# Patient Record
Sex: MALE | Race: WHITE | NOT HISPANIC OR LATINO
[De-identification: names, ages, dates, MRNs, and addresses within clinical notes are randomized per-mention and may not be internally consistent; named-entity substitution may affect disease eponyms.]

---

## 2018-02-23 ENCOUNTER — TRANSCRIPTION ENCOUNTER (OUTPATIENT)
Age: 58
End: 2018-02-23

## 2018-02-25 ENCOUNTER — FORM ENCOUNTER (OUTPATIENT)
Age: 58
End: 2018-02-25

## 2018-02-26 ENCOUNTER — OUTPATIENT (OUTPATIENT)
Dept: OUTPATIENT SERVICES | Facility: HOSPITAL | Age: 58
LOS: 1 days | End: 2018-02-26

## 2018-02-26 ENCOUNTER — APPOINTMENT (OUTPATIENT)
Dept: RADIOLOGY | Facility: CLINIC | Age: 58
End: 2018-02-26
Payer: MEDICAID

## 2018-02-26 ENCOUNTER — APPOINTMENT (OUTPATIENT)
Dept: ORTHOPEDIC SURGERY | Facility: CLINIC | Age: 58
End: 2018-02-26
Payer: MEDICAID

## 2018-02-26 VITALS — WEIGHT: 123 LBS | BODY MASS INDEX: 18.64 KG/M2 | HEIGHT: 68 IN

## 2018-02-26 DIAGNOSIS — Z80.9 FAMILY HISTORY OF MALIGNANT NEOPLASM, UNSPECIFIED: ICD-10-CM

## 2018-02-26 PROBLEM — Z00.00 ENCOUNTER FOR PREVENTIVE HEALTH EXAMINATION: Status: ACTIVE | Noted: 2018-02-26

## 2018-02-26 PROCEDURE — 99204 OFFICE O/P NEW MOD 45 MIN: CPT | Mod: 25

## 2018-02-26 PROCEDURE — 20605 DRAIN/INJ JOINT/BURSA W/O US: CPT | Mod: LT

## 2018-02-26 PROCEDURE — 71046 X-RAY EXAM CHEST 2 VIEWS: CPT | Mod: 26

## 2018-02-26 RX ORDER — RITONAVIR 100 MG/1
100 CAPSULE ORAL
Refills: 0 | Status: ACTIVE | COMMUNITY

## 2018-02-26 RX ORDER — IBUPROFEN 600 MG/1
600 TABLET, FILM COATED ORAL
Refills: 0 | Status: ACTIVE | COMMUNITY

## 2018-02-27 ENCOUNTER — NON-APPOINTMENT (OUTPATIENT)
Age: 58
End: 2018-02-27

## 2018-02-27 ENCOUNTER — LABORATORY RESULT (OUTPATIENT)
Age: 58
End: 2018-02-27

## 2018-02-27 ENCOUNTER — APPOINTMENT (OUTPATIENT)
Dept: FAMILY MEDICINE | Facility: CLINIC | Age: 58
End: 2018-02-27
Payer: MEDICAID

## 2018-02-27 VITALS
HEART RATE: 66 BPM | DIASTOLIC BLOOD PRESSURE: 72 MMHG | TEMPERATURE: 97.3 F | HEIGHT: 67.5 IN | WEIGHT: 124 LBS | BODY MASS INDEX: 19.24 KG/M2 | OXYGEN SATURATION: 96 % | SYSTOLIC BLOOD PRESSURE: 132 MMHG

## 2018-02-27 DIAGNOSIS — H61.22 IMPACTED CERUMEN, LEFT EAR: ICD-10-CM

## 2018-02-27 DIAGNOSIS — Z01.818 ENCOUNTER FOR OTHER PREPROCEDURAL EXAMINATION: ICD-10-CM

## 2018-02-27 DIAGNOSIS — Z21 ASYMPTOMATIC HUMAN IMMUNODEFICIENCY VIRUS [HIV] INFECTION STATUS: ICD-10-CM

## 2018-02-27 DIAGNOSIS — F17.200 NICOTINE DEPENDENCE, UNSPECIFIED, UNCOMPLICATED: ICD-10-CM

## 2018-02-27 DIAGNOSIS — Z80.3 FAMILY HISTORY OF MALIGNANT NEOPLASM OF BREAST: ICD-10-CM

## 2018-02-27 DIAGNOSIS — F17.210 NICOTINE DEPENDENCE, CIGARETTES, UNCOMPLICATED: ICD-10-CM

## 2018-02-27 PROCEDURE — 36415 COLL VENOUS BLD VENIPUNCTURE: CPT

## 2018-02-27 PROCEDURE — 93000 ELECTROCARDIOGRAM COMPLETE: CPT

## 2018-02-27 PROCEDURE — 99204 OFFICE O/P NEW MOD 45 MIN: CPT | Mod: 25

## 2018-02-27 RX ORDER — BUPROPION HYDROCHLORIDE 100 MG/1
100 TABLET, FILM COATED ORAL DAILY
Refills: 0 | Status: ACTIVE | COMMUNITY

## 2018-02-27 RX ORDER — ROSUVASTATIN CALCIUM 10 MG/1
10 TABLET, FILM COATED ORAL
Refills: 0 | Status: ACTIVE | COMMUNITY

## 2018-02-27 RX ORDER — ATAZANAVIR 300 MG/1
CAPSULE, GELATIN COATED ORAL
Refills: 0 | Status: ACTIVE | COMMUNITY

## 2018-02-27 RX ORDER — EMTRICITABINE AND TENOFOVIR DISOPROXIL FUMARATE 200; 300 MG/1; MG/1
200-300 TABLET, FILM COATED ORAL
Refills: 0 | Status: ACTIVE | COMMUNITY

## 2018-02-28 LAB
ALBUMIN SERPL ELPH-MCNC: 4 G/DL
ALP BLD-CCNC: 123 U/L
ALT SERPL-CCNC: 27 U/L
ANION GAP SERPL CALC-SCNC: 12 MMOL/L
APPEARANCE: CLEAR
APTT BLD: 31.9 SEC
AST SERPL-CCNC: 30 U/L
BASOPHILS # BLD AUTO: 0.06 K/UL
BASOPHILS NFR BLD AUTO: 0.8 %
BILIRUB SERPL-MCNC: 1.9 MG/DL
BILIRUBIN URINE: NEGATIVE
BLOOD URINE: NEGATIVE
BUN SERPL-MCNC: 20 MG/DL
CALCIUM SERPL-MCNC: 9.8 MG/DL
CHLORIDE SERPL-SCNC: 102 MMOL/L
CO2 SERPL-SCNC: 25 MMOL/L
COLOR: YELLOW
CREAT SERPL-MCNC: 0.93 MG/DL
EOSINOPHIL # BLD AUTO: 0.19 K/UL
EOSINOPHIL NFR BLD AUTO: 2.5 %
GLUCOSE QUALITATIVE U: NEGATIVE MG/DL
GLUCOSE SERPL-MCNC: 58 MG/DL
HCT VFR BLD CALC: 49 %
HGB BLD-MCNC: 15.9 G/DL
IMM GRANULOCYTES NFR BLD AUTO: 0.3 %
INR PPP: 0.94 RATIO
KETONES URINE: NEGATIVE
LEUKOCYTE ESTERASE URINE: NEGATIVE
LYMPHOCYTES # BLD AUTO: 2.22 K/UL
LYMPHOCYTES NFR BLD AUTO: 29.5 %
MAN DIFF?: NORMAL
MCHC RBC-ENTMCNC: 32.4 GM/DL
MCHC RBC-ENTMCNC: 33.1 PG
MCV RBC AUTO: 101.9 FL
MONOCYTES # BLD AUTO: 0.62 K/UL
MONOCYTES NFR BLD AUTO: 8.2 %
NEUTROPHILS # BLD AUTO: 4.42 K/UL
NEUTROPHILS NFR BLD AUTO: 58.7 %
NITRITE URINE: NEGATIVE
PH URINE: 5
PLATELET # BLD AUTO: 218 K/UL
POTASSIUM SERPL-SCNC: 4.6 MMOL/L
PROT SERPL-MCNC: 6.9 G/DL
PROTEIN URINE: 30 MG/DL
PT BLD: 10.6 SEC
RBC # BLD: 4.81 M/UL
RBC # FLD: 14.4 %
SODIUM SERPL-SCNC: 139 MMOL/L
SPECIFIC GRAVITY URINE: 1.01
UROBILINOGEN URINE: NEGATIVE MG/DL
WBC # FLD AUTO: 7.53 K/UL

## 2018-03-06 RX ORDER — ERGOCALCIFEROL 1.25 MG/1
1.25 MG CAPSULE, LIQUID FILLED ORAL
Qty: 8 | Refills: 0 | Status: ACTIVE | COMMUNITY
Start: 2018-03-06 | End: 1900-01-01

## 2018-03-06 RX ORDER — ASCORBIC ACID 500 MG
500 TABLET ORAL DAILY
Qty: 36 | Refills: 0 | Status: ACTIVE | COMMUNITY
Start: 2018-03-06 | End: 1900-01-01

## 2018-03-07 ENCOUNTER — APPOINTMENT (OUTPATIENT)
Dept: ORTHOPEDIC SURGERY | Facility: AMBULATORY SURGERY CENTER | Age: 58
End: 2018-03-07

## 2018-03-07 ENCOUNTER — OUTPATIENT (OUTPATIENT)
Dept: OUTPATIENT SERVICES | Facility: HOSPITAL | Age: 58
LOS: 1 days | Discharge: HOME | End: 2018-03-07
Payer: MEDICAID

## 2018-03-07 PROCEDURE — 25650 CLTX ULNAR STYLOID FRACTURE: CPT | Mod: 59,LT

## 2018-03-07 PROCEDURE — 25609 OPTX DST RD XART FX/EP SEP3+: CPT | Mod: LT

## 2018-03-18 ENCOUNTER — FORM ENCOUNTER (OUTPATIENT)
Age: 58
End: 2018-03-18

## 2018-03-19 ENCOUNTER — APPOINTMENT (OUTPATIENT)
Dept: RADIOLOGY | Facility: CLINIC | Age: 58
End: 2018-03-19

## 2018-03-19 ENCOUNTER — OUTPATIENT (OUTPATIENT)
Dept: OUTPATIENT SERVICES | Facility: HOSPITAL | Age: 58
LOS: 1 days | End: 2018-03-19
Payer: MEDICAID

## 2018-03-19 ENCOUNTER — APPOINTMENT (OUTPATIENT)
Dept: ORTHOPEDIC SURGERY | Facility: CLINIC | Age: 58
End: 2018-03-19
Payer: MEDICAID

## 2018-03-19 VITALS — HEIGHT: 67.5 IN | WEIGHT: 125 LBS | BODY MASS INDEX: 19.39 KG/M2 | RESPIRATION RATE: 16 BRPM

## 2018-03-19 PROCEDURE — 73110 X-RAY EXAM OF WRIST: CPT | Mod: 26,LT

## 2018-03-19 PROCEDURE — 99024 POSTOP FOLLOW-UP VISIT: CPT

## 2018-04-09 ENCOUNTER — OUTPATIENT (OUTPATIENT)
Dept: OUTPATIENT SERVICES | Facility: HOSPITAL | Age: 58
LOS: 1 days | End: 2018-04-09

## 2018-04-09 ENCOUNTER — APPOINTMENT (OUTPATIENT)
Dept: ORTHOPEDIC SURGERY | Facility: CLINIC | Age: 58
End: 2018-04-09
Payer: MEDICAID

## 2018-04-09 ENCOUNTER — APPOINTMENT (OUTPATIENT)
Dept: RADIOLOGY | Facility: CLINIC | Age: 58
End: 2018-04-09
Payer: MEDICAID

## 2018-04-09 VITALS — BODY MASS INDEX: 19.39 KG/M2 | WEIGHT: 125 LBS | HEIGHT: 67.5 IN

## 2018-04-09 PROCEDURE — 73110 X-RAY EXAM OF WRIST: CPT | Mod: 26,LT

## 2018-04-09 PROCEDURE — 73110 X-RAY EXAM OF WRIST: CPT | Mod: LT

## 2018-04-09 PROCEDURE — 99024 POSTOP FOLLOW-UP VISIT: CPT

## 2018-04-09 RX ORDER — ONDANSETRON 4 MG/1
4 TABLET ORAL EVERY 8 HOURS
Qty: 6 | Refills: 0 | Status: DISCONTINUED | COMMUNITY
Start: 2018-03-06 | End: 2018-04-09

## 2018-04-09 RX ORDER — OXYCODONE AND ACETAMINOPHEN 5; 325 MG/1; MG/1
5-325 TABLET ORAL
Qty: 20 | Refills: 0 | Status: DISCONTINUED | COMMUNITY
Start: 2018-03-06 | End: 2018-04-09

## 2018-04-09 RX ORDER — ASPIRIN 81 MG
6.5 TABLET, DELAYED RELEASE (ENTERIC COATED) ORAL TWICE DAILY
Qty: 1 | Refills: 0 | Status: DISCONTINUED | COMMUNITY
Start: 2018-02-27 | End: 2018-04-09

## 2018-05-07 ENCOUNTER — APPOINTMENT (OUTPATIENT)
Dept: ORTHOPEDIC SURGERY | Facility: CLINIC | Age: 58
End: 2018-05-07

## 2018-05-09 ENCOUNTER — FORM ENCOUNTER (OUTPATIENT)
Age: 58
End: 2018-05-09

## 2018-05-10 ENCOUNTER — APPOINTMENT (OUTPATIENT)
Dept: ORTHOPEDIC SURGERY | Facility: CLINIC | Age: 58
End: 2018-05-10
Payer: MEDICAID

## 2018-05-10 ENCOUNTER — APPOINTMENT (OUTPATIENT)
Dept: RADIOLOGY | Facility: CLINIC | Age: 58
End: 2018-05-10

## 2018-05-10 ENCOUNTER — OUTPATIENT (OUTPATIENT)
Dept: OUTPATIENT SERVICES | Facility: HOSPITAL | Age: 58
LOS: 1 days | End: 2018-05-10
Payer: MEDICAID

## 2018-05-10 DIAGNOSIS — S52.612A DISPLACED FRACTURE OF LEFT ULNA STYLOID PROCESS, INITIAL ENCOUNTER FOR CLOSED FRACTURE: ICD-10-CM

## 2018-05-10 PROCEDURE — 73110 X-RAY EXAM OF WRIST: CPT | Mod: 26,LT

## 2018-05-10 PROCEDURE — 99024 POSTOP FOLLOW-UP VISIT: CPT

## 2018-06-20 ENCOUNTER — FORM ENCOUNTER (OUTPATIENT)
Age: 58
End: 2018-06-20

## 2018-06-21 ENCOUNTER — OUTPATIENT (OUTPATIENT)
Dept: OUTPATIENT SERVICES | Facility: HOSPITAL | Age: 58
LOS: 1 days | End: 2018-06-21
Payer: MEDICAID

## 2018-06-21 ENCOUNTER — APPOINTMENT (OUTPATIENT)
Dept: RADIOLOGY | Facility: CLINIC | Age: 58
End: 2018-06-21

## 2018-06-21 ENCOUNTER — APPOINTMENT (OUTPATIENT)
Dept: ORTHOPEDIC SURGERY | Facility: CLINIC | Age: 58
End: 2018-06-21
Payer: MEDICAID

## 2018-06-21 VITALS — BODY MASS INDEX: 19.39 KG/M2 | HEIGHT: 67.5 IN | WEIGHT: 125 LBS

## 2018-06-21 DIAGNOSIS — S52.572A OTHER INTRAARTICULAR FRACTURE OF LOWER END OF LEFT RADIUS, INITIAL ENCOUNTER FOR CLOSED FRACTURE: ICD-10-CM

## 2018-06-21 PROCEDURE — 73110 X-RAY EXAM OF WRIST: CPT | Mod: 26,LT

## 2018-06-21 PROCEDURE — 99213 OFFICE O/P EST LOW 20 MIN: CPT

## 2018-06-21 PROCEDURE — 73110 X-RAY EXAM OF WRIST: CPT

## 2020-12-04 ENCOUNTER — TRANSCRIPTION ENCOUNTER (OUTPATIENT)
Age: 60
End: 2020-12-04

## 2020-12-05 ENCOUNTER — INPATIENT (INPATIENT)
Facility: HOSPITAL | Age: 60
LOS: 1 days | Discharge: HOME | DRG: 511 | End: 2020-12-07
Attending: ORTHOPAEDIC SURGERY | Admitting: ORTHOPAEDIC SURGERY
Payer: COMMERCIAL

## 2020-12-05 VITALS
TEMPERATURE: 98 F | DIASTOLIC BLOOD PRESSURE: 91 MMHG | SYSTOLIC BLOOD PRESSURE: 165 MMHG | WEIGHT: 130.07 LBS | HEIGHT: 68 IN | HEART RATE: 58 BPM | OXYGEN SATURATION: 96 % | RESPIRATION RATE: 16 BRPM

## 2020-12-05 LAB
ALBUMIN SERPL ELPH-MCNC: 3.6 G/DL — SIGNIFICANT CHANGE UP (ref 3.4–5)
ALP SERPL-CCNC: 108 U/L — SIGNIFICANT CHANGE UP (ref 40–120)
ALT FLD-CCNC: 32 U/L — SIGNIFICANT CHANGE UP (ref 12–42)
ANION GAP SERPL CALC-SCNC: 9 MMOL/L — SIGNIFICANT CHANGE UP (ref 9–16)
APTT BLD: 29 SEC — SIGNIFICANT CHANGE UP (ref 27.5–35.5)
AST SERPL-CCNC: 24 U/L — SIGNIFICANT CHANGE UP (ref 15–37)
BASOPHILS # BLD AUTO: 0.1 K/UL — SIGNIFICANT CHANGE UP (ref 0–0.2)
BASOPHILS NFR BLD AUTO: 1 % — SIGNIFICANT CHANGE UP (ref 0–2)
BILIRUB SERPL-MCNC: 0.3 MG/DL — SIGNIFICANT CHANGE UP (ref 0.2–1.2)
BUN SERPL-MCNC: 25 MG/DL — HIGH (ref 7–23)
CALCIUM SERPL-MCNC: 10 MG/DL — SIGNIFICANT CHANGE UP (ref 8.5–10.5)
CHLORIDE SERPL-SCNC: 102 MMOL/L — SIGNIFICANT CHANGE UP (ref 96–108)
CO2 SERPL-SCNC: 29 MMOL/L — SIGNIFICANT CHANGE UP (ref 22–31)
CREAT SERPL-MCNC: 1.44 MG/DL — HIGH (ref 0.5–1.3)
EOSINOPHIL # BLD AUTO: 0.48 K/UL — SIGNIFICANT CHANGE UP (ref 0–0.5)
EOSINOPHIL NFR BLD AUTO: 4.9 % — SIGNIFICANT CHANGE UP (ref 0–6)
GLUCOSE SERPL-MCNC: 118 MG/DL — HIGH (ref 70–99)
HCT VFR BLD CALC: 44.9 % — SIGNIFICANT CHANGE UP (ref 39–50)
HGB BLD-MCNC: 15.7 G/DL — SIGNIFICANT CHANGE UP (ref 13–17)
IMM GRANULOCYTES NFR BLD AUTO: 0.4 % — SIGNIFICANT CHANGE UP (ref 0–1.5)
INR BLD: 0.98 — SIGNIFICANT CHANGE UP (ref 0.88–1.16)
LYMPHOCYTES # BLD AUTO: 3.06 K/UL — SIGNIFICANT CHANGE UP (ref 1–3.3)
LYMPHOCYTES # BLD AUTO: 31.5 % — SIGNIFICANT CHANGE UP (ref 13–44)
MCHC RBC-ENTMCNC: 33.1 PG — SIGNIFICANT CHANGE UP (ref 27–34)
MCHC RBC-ENTMCNC: 35 GM/DL — SIGNIFICANT CHANGE UP (ref 32–36)
MCV RBC AUTO: 94.7 FL — SIGNIFICANT CHANGE UP (ref 80–100)
MONOCYTES # BLD AUTO: 0.82 K/UL — SIGNIFICANT CHANGE UP (ref 0–0.9)
MONOCYTES NFR BLD AUTO: 8.4 % — SIGNIFICANT CHANGE UP (ref 2–14)
NEUTROPHILS # BLD AUTO: 5.21 K/UL — SIGNIFICANT CHANGE UP (ref 1.8–7.4)
NEUTROPHILS NFR BLD AUTO: 53.8 % — SIGNIFICANT CHANGE UP (ref 43–77)
NRBC # BLD: 0 /100 WBCS — SIGNIFICANT CHANGE UP (ref 0–0)
PLATELET # BLD AUTO: 250 K/UL — SIGNIFICANT CHANGE UP (ref 150–400)
POTASSIUM SERPL-MCNC: 3.9 MMOL/L — SIGNIFICANT CHANGE UP (ref 3.5–5.3)
POTASSIUM SERPL-SCNC: 3.9 MMOL/L — SIGNIFICANT CHANGE UP (ref 3.5–5.3)
PROT SERPL-MCNC: 7.2 G/DL — SIGNIFICANT CHANGE UP (ref 6.4–8.2)
PROTHROM AB SERPL-ACNC: 11.8 SEC — SIGNIFICANT CHANGE UP (ref 10.6–13.6)
RBC # BLD: 4.74 M/UL — SIGNIFICANT CHANGE UP (ref 4.2–5.8)
RBC # FLD: 13.2 % — SIGNIFICANT CHANGE UP (ref 10.3–14.5)
SARS-COV-2 RNA SPEC QL NAA+PROBE: SIGNIFICANT CHANGE UP
SODIUM SERPL-SCNC: 140 MMOL/L — SIGNIFICANT CHANGE UP (ref 132–145)
WBC # BLD: 9.71 K/UL — SIGNIFICANT CHANGE UP (ref 3.8–10.5)
WBC # FLD AUTO: 9.71 K/UL — SIGNIFICANT CHANGE UP (ref 3.8–10.5)

## 2020-12-05 PROCEDURE — 73080 X-RAY EXAM OF ELBOW: CPT | Mod: 26,RT

## 2020-12-05 PROCEDURE — 70450 CT HEAD/BRAIN W/O DYE: CPT | Mod: 26

## 2020-12-05 PROCEDURE — 99284 EMERGENCY DEPT VISIT MOD MDM: CPT | Mod: 25

## 2020-12-05 PROCEDURE — 70486 CT MAXILLOFACIAL W/O DYE: CPT | Mod: 26

## 2020-12-05 PROCEDURE — 93010 ELECTROCARDIOGRAM REPORT: CPT | Mod: XU

## 2020-12-05 PROCEDURE — 12001 RPR S/N/AX/GEN/TRNK 2.5CM/<: CPT

## 2020-12-05 RX ORDER — MORPHINE SULFATE 50 MG/1
4 CAPSULE, EXTENDED RELEASE ORAL ONCE
Refills: 0 | Status: DISCONTINUED | OUTPATIENT
Start: 2020-12-05 | End: 2020-12-05

## 2020-12-05 RX ORDER — TETANUS TOXOID, REDUCED DIPHTHERIA TOXOID AND ACELLULAR PERTUSSIS VACCINE, ADSORBED 5; 2.5; 8; 8; 2.5 [IU]/.5ML; [IU]/.5ML; UG/.5ML; UG/.5ML; UG/.5ML
0.5 SUSPENSION INTRAMUSCULAR ONCE
Refills: 0 | Status: COMPLETED | OUTPATIENT
Start: 2020-12-05 | End: 2020-12-05

## 2020-12-05 RX ADMIN — MORPHINE SULFATE 4 MILLIGRAM(S): 50 CAPSULE, EXTENDED RELEASE ORAL at 21:56

## 2020-12-05 RX ADMIN — MORPHINE SULFATE 4 MILLIGRAM(S): 50 CAPSULE, EXTENDED RELEASE ORAL at 23:58

## 2020-12-05 RX ADMIN — TETANUS TOXOID, REDUCED DIPHTHERIA TOXOID AND ACELLULAR PERTUSSIS VACCINE, ADSORBED 0.5 MILLILITER(S): 5; 2.5; 8; 8; 2.5 SUSPENSION INTRAMUSCULAR at 21:54

## 2020-12-05 RX ADMIN — MORPHINE SULFATE 4 MILLIGRAM(S): 50 CAPSULE, EXTENDED RELEASE ORAL at 19:53

## 2020-12-05 NOTE — ED PROVIDER NOTE - ATTENDING CONTRIBUTION TO CARE
Patient with Monteggia Fracture, neurovascularly intact. Transfer to [facility] for admission. Accepting physician Dr. Palomo. CT head read discussed with resident-no acute intracranial abnormality.

## 2020-12-05 NOTE — ED PROVIDER NOTE - OBJECTIVE STATEMENT
59 yo male with hx HIV on meds (VL undetectable, good CD4 count as per patient) presents s/p mechanical fall. tripped and fell, landed on right am and scraped right knee. also sustained abrasion to nose. denies LOC/chest pain/dyspnea/abdominal pain or neck pain. ambulated to ED. unknown last tetanus. c/o pain to right elbow with swelling. denies numbness or weakness.

## 2020-12-05 NOTE — ED PROVIDER NOTE - CARE PLAN
Principal Discharge DX:	Monteggia fracture, closed   Principal Discharge DX:	Monteggia fracture, closed  Secondary Diagnosis:	Fall

## 2020-12-05 NOTE — ED PROVIDER NOTE - CLINICAL SUMMARY MEDICAL DECISION MAKING FREE TEXT BOX
s/p mechanical fall today c/o right elbow pain with limited ROM, found to have closed right monteggia fracture, nvi, closed, d/w dr. correa, will need inpatient admission for surgical management. splinted. pre-op labs ordered including covid swabs. ct brain/ct maxillofacial ordered due to abrasions to face. abrasions cleansed, bacitracin applied, superficial lac to left hand irrigated, skin glue. tetanus updated. patient agrees with plan. splinted for transport. s/p mechanical fall today c/o right elbow pain with limited ROM, found to have closed right monteggia fracture, nvi, closed, d/w dr. correa, will need inpatient admission for surgical management. splinted. pre-op labs ordered including covid swabs. ct brain/ct maxillofacial ordered due to abrasions to face. abrasions cleansed, bacitracin applied, superficial lac to left hand irrigated, lac repair with skin glue. tetanus updated. patient agrees with plan.

## 2020-12-05 NOTE — ED ADULT NURSE NOTE - CHIEF COMPLAINT QUOTE
c/o right elbow/arm pain and abrasions to left palm and bridge of nose s/p trip and fall approx 1hr PTA. denies LOC or ACs. last tetanus unknown. pt pain upgraded.

## 2020-12-05 NOTE — ED ADULT TRIAGE NOTE - CHIEF COMPLAINT QUOTE
c/o right elbow/arm pain and abrasions to left palm and bridge of nose s/p trip and fall approx 1hr PTA. last tetanus unknown. pt pain upgraded. c/o right elbow/arm pain and abrasions to left palm and bridge of nose s/p trip and fall approx 1hr PTA. denies LOC or ACs. last tetanus unknown. pt pain upgraded.

## 2020-12-05 NOTE — ED PROVIDER NOTE - PATIENT PORTAL LINK FT
You can access the FollowMyHealth Patient Portal offered by [facility] by registering at the following website: [email or web address]. By joining [facility]’s FollowMyHealth portal, you will also be able to view your health information using other applications (apps) compatible with our system.

## 2020-12-05 NOTE — ED ADULT NURSE NOTE - OBJECTIVE STATEMENT
Pt presents c/o 8/10 pain 2ndary to mechanical trip and fall.  Pt has +deformity to right elbow, but is able to move x5 digits of RUE and has good cap refill.  Pt has +laceration to left palm and face abrasions.  Pt denies LOC, visual change, dizziness or CP.  Pt cannot elicit date of last tetanus.  Pt is pending eval by [facility].

## 2020-12-05 NOTE — ED PROVIDER NOTE - PHYSICAL EXAMINATION
CONSTITUTIONAL: Well-appearing; well-nourished; in no apparent distress.   	HEAD: Normocephalic;   superficial abrasion to nose, no bony tenderness or nasal swelling. no signs of trauma to scalp  	EYES:  conjunctiva and sclera clear  	ENT: normal nose; no rhinorrhea; normal pharynx with no erythema or lesions.   	NECK: Supple; non-tender; no midline tenderness, good ROM  	CARDIOVASCULAR: Normal S1, S2; no murmurs, rubs, or gallops. Regular rate and rhythm.   	RESPIRATORY: Breathing easily; breath sounds clear and equal bilaterally; no wheezes, rhonchi, or rales.  	GI: Soft; non-distended; non-tender  back: no midline tenderness or bony stepoffs.  	EXT:   RUE: +moderate swelling to right elbow with ttp, no sensory deficits, distal pulses intact, no proximal forearm or wrist tenderness. no shoulder tenderness. radial/medial/ulnar nerves intact.  abrasions to left hand, superficial laceration to lateral palm no active bleeding or fb.   full ROM lower extremities, nontender  ambulatory  	SKIN: Normal for age and race; warm; dry; good turgor; no apparent lesions or rash.   	NEURO: A & O x 3; face symmetric; grossly unremarkable.   PSYCHOLOGICAL: The patient’s mood and manner are appropriate.

## 2020-12-05 NOTE — ED ADULT NURSE NOTE - EXTENSIONS OF SELF_ADULT
03/20/2019  Estella Arevalo is a 63 y.o., female.    Anesthesia Evaluation         Review of Systems  Anesthesia Hx:  No problems with previous Anesthesia   Social:  Alcohol Use opiods and benzo   Hematology/Oncology:  Hematology Normal   Oncology Normal     EENT/Dental:EENT/Dental Normal   Cardiovascular:   Hypertension Past MI (not sure when) CAD      Pulmonary:  Pulmonary Normal    Renal/:  Renal/ Normal     Hepatic/GI:   GERD Liver Disease, Hepatitis    Musculoskeletal:   Arthritis     Neurological:   Headaches    Endocrine:   Hypothyroidism    Dermatological:  Skin Normal    Psych:   Psychiatric History          Physical Exam  General:  Well nourished    Airway/Jaw/Neck:  Airway Findings: Mallampati: II TM Distance: < 4 cm      Dental:  Dental Findings: (chipped upper teeth)   Chest/Lungs:  Chest/Lungs Clear    Heart/Vascular:  Heart Findings: Normal       Mental Status:  Mental Status Findings:  Cooperative, Alert and Oriented         Anesthesia Plan  Type of Anesthesia, risks & benefits discussed:  Anesthesia Type:  general  Patient's Preference:   Intra-op Monitoring Plan: standard ASA monitors  Intra-op Monitoring Plan Comments:   Post Op Pain Control Plan: multimodal analgesia, IV/PO Opioids PRN and per primary service following discharge from PACU  Post Op Pain Control Plan Comments:   Induction:    Beta Blocker:  Patient is not currently on a Beta-Blocker (No further documentation required).       Informed Consent: Patient understands risks and agrees with Anesthesia plan.  Questions answered. Anesthesia consent signed with patient.  ASA Score: 3     Day of Surgery Review of History & Physical:    H&P update referred to the provider.  H&P completed by Anesthesiologist.   Anesthesia Plan Notes: npo        Ready For Surgery From Anesthesia Perspective.       
None

## 2020-12-06 DIAGNOSIS — S52.279A MONTEGGIA'S FRACTURE OF UNSPECIFIED ULNA, INITIAL ENCOUNTER FOR CLOSED FRACTURE: ICD-10-CM

## 2020-12-06 DIAGNOSIS — B20 HUMAN IMMUNODEFICIENCY VIRUS [HIV] DISEASE: ICD-10-CM

## 2020-12-06 LAB
ANION GAP SERPL CALC-SCNC: 10 MMOL/L — SIGNIFICANT CHANGE UP (ref 5–17)
APPEARANCE UR: CLEAR — SIGNIFICANT CHANGE UP
BACTERIA # UR AUTO: SIGNIFICANT CHANGE UP /HPF
BASOPHILS # BLD AUTO: 0.06 K/UL — SIGNIFICANT CHANGE UP (ref 0–0.2)
BASOPHILS NFR BLD AUTO: 0.7 % — SIGNIFICANT CHANGE UP (ref 0–2)
BILIRUB UR-MCNC: NEGATIVE — SIGNIFICANT CHANGE UP
BUN SERPL-MCNC: 19 MG/DL — SIGNIFICANT CHANGE UP (ref 7–23)
CALCIUM SERPL-MCNC: 9.2 MG/DL — SIGNIFICANT CHANGE UP (ref 8.4–10.5)
CHLORIDE SERPL-SCNC: 105 MMOL/L — SIGNIFICANT CHANGE UP (ref 96–108)
CO2 SERPL-SCNC: 23 MMOL/L — SIGNIFICANT CHANGE UP (ref 22–31)
COLOR SPEC: YELLOW — SIGNIFICANT CHANGE UP
CREAT SERPL-MCNC: 1.11 MG/DL — SIGNIFICANT CHANGE UP (ref 0.5–1.3)
DIFF PNL FLD: ABNORMAL
EOSINOPHIL # BLD AUTO: 0.38 K/UL — SIGNIFICANT CHANGE UP (ref 0–0.5)
EOSINOPHIL NFR BLD AUTO: 4.2 % — SIGNIFICANT CHANGE UP (ref 0–6)
EPI CELLS # UR: SIGNIFICANT CHANGE UP /HPF (ref 0–5)
GLUCOSE SERPL-MCNC: 90 MG/DL — SIGNIFICANT CHANGE UP (ref 70–99)
GLUCOSE UR QL: NEGATIVE — SIGNIFICANT CHANGE UP
HCT VFR BLD CALC: 43.6 % — SIGNIFICANT CHANGE UP (ref 39–50)
HCV AB S/CO SERPL IA: 0.1 S/CO — SIGNIFICANT CHANGE UP
HCV AB SERPL-IMP: SIGNIFICANT CHANGE UP
HGB BLD-MCNC: 14.4 G/DL — SIGNIFICANT CHANGE UP (ref 13–17)
IMM GRANULOCYTES NFR BLD AUTO: 0.2 % — SIGNIFICANT CHANGE UP (ref 0–1.5)
KETONES UR-MCNC: NEGATIVE — SIGNIFICANT CHANGE UP
LEUKOCYTE ESTERASE UR-ACNC: NEGATIVE — SIGNIFICANT CHANGE UP
LYMPHOCYTES # BLD AUTO: 2.67 K/UL — SIGNIFICANT CHANGE UP (ref 1–3.3)
LYMPHOCYTES # BLD AUTO: 29.4 % — SIGNIFICANT CHANGE UP (ref 13–44)
MCHC RBC-ENTMCNC: 31.4 PG — SIGNIFICANT CHANGE UP (ref 27–34)
MCHC RBC-ENTMCNC: 33 GM/DL — SIGNIFICANT CHANGE UP (ref 32–36)
MCV RBC AUTO: 95 FL — SIGNIFICANT CHANGE UP (ref 80–100)
MONOCYTES # BLD AUTO: 0.9 K/UL — SIGNIFICANT CHANGE UP (ref 0–0.9)
MONOCYTES NFR BLD AUTO: 9.9 % — SIGNIFICANT CHANGE UP (ref 2–14)
NEUTROPHILS # BLD AUTO: 5.06 K/UL — SIGNIFICANT CHANGE UP (ref 1.8–7.4)
NEUTROPHILS NFR BLD AUTO: 55.6 % — SIGNIFICANT CHANGE UP (ref 43–77)
NITRITE UR-MCNC: NEGATIVE — SIGNIFICANT CHANGE UP
NRBC # BLD: 0 /100 WBCS — SIGNIFICANT CHANGE UP (ref 0–0)
PH UR: 6 — SIGNIFICANT CHANGE UP (ref 5–8)
PLATELET # BLD AUTO: 189 K/UL — SIGNIFICANT CHANGE UP (ref 150–400)
POTASSIUM SERPL-MCNC: 3.8 MMOL/L — SIGNIFICANT CHANGE UP (ref 3.5–5.3)
POTASSIUM SERPL-SCNC: 3.8 MMOL/L — SIGNIFICANT CHANGE UP (ref 3.5–5.3)
PROT UR-MCNC: NEGATIVE MG/DL — SIGNIFICANT CHANGE UP
RBC # BLD: 4.59 M/UL — SIGNIFICANT CHANGE UP (ref 4.2–5.8)
RBC # FLD: 13.2 % — SIGNIFICANT CHANGE UP (ref 10.3–14.5)
RBC CASTS # UR COMP ASSIST: < 5 /HPF — SIGNIFICANT CHANGE UP
SODIUM SERPL-SCNC: 138 MMOL/L — SIGNIFICANT CHANGE UP (ref 135–145)
SP GR SPEC: 1.02 — SIGNIFICANT CHANGE UP (ref 1–1.03)
UROBILINOGEN FLD QL: 0.2 E.U./DL — SIGNIFICANT CHANGE UP
WBC # BLD: 9.09 K/UL — SIGNIFICANT CHANGE UP (ref 3.8–10.5)
WBC # FLD AUTO: 9.09 K/UL — SIGNIFICANT CHANGE UP (ref 3.8–10.5)
WBC UR QL: < 5 /HPF — SIGNIFICANT CHANGE UP

## 2020-12-06 PROCEDURE — 73200 CT UPPER EXTREMITY W/O DYE: CPT | Mod: 26,RT

## 2020-12-06 PROCEDURE — 73070 X-RAY EXAM OF ELBOW: CPT | Mod: 26,RT

## 2020-12-06 RX ORDER — ONDANSETRON 8 MG/1
4 TABLET, FILM COATED ORAL ONCE
Refills: 0 | Status: COMPLETED | OUTPATIENT
Start: 2020-12-06 | End: 2020-12-06

## 2020-12-06 RX ORDER — BUPROPION HYDROCHLORIDE 150 MG/1
300 TABLET, EXTENDED RELEASE ORAL DAILY
Refills: 0 | Status: DISCONTINUED | OUTPATIENT
Start: 2020-12-06 | End: 2020-12-07

## 2020-12-06 RX ORDER — ATORVASTATIN CALCIUM 80 MG/1
20 TABLET, FILM COATED ORAL AT BEDTIME
Refills: 0 | Status: DISCONTINUED | OUTPATIENT
Start: 2020-12-06 | End: 2020-12-07

## 2020-12-06 RX ORDER — BICTEGRAVIR SODIUM, EMTRICITABINE, AND TENOFOVIR ALAFENAMIDE FUMARATE 30; 120; 15 MG/1; MG/1; MG/1
1 TABLET ORAL DAILY
Refills: 0 | Status: DISCONTINUED | OUTPATIENT
Start: 2020-12-06 | End: 2020-12-07

## 2020-12-06 RX ORDER — BACITRACIN ZINC 500 UNIT/G
1 OINTMENT IN PACKET (EA) TOPICAL
Refills: 0 | Status: DISCONTINUED | OUTPATIENT
Start: 2020-12-06 | End: 2020-12-07

## 2020-12-06 RX ORDER — POVIDONE-IODINE 5 %
1 AEROSOL (ML) TOPICAL ONCE
Refills: 0 | Status: COMPLETED | OUTPATIENT
Start: 2020-12-06 | End: 2020-12-06

## 2020-12-06 RX ORDER — CEFAZOLIN SODIUM 1 G
2000 VIAL (EA) INJECTION EVERY 8 HOURS
Refills: 0 | Status: COMPLETED | OUTPATIENT
Start: 2020-12-06 | End: 2020-12-07

## 2020-12-06 RX ORDER — SODIUM CHLORIDE 9 MG/ML
1000 INJECTION, SOLUTION INTRAVENOUS
Refills: 0 | Status: DISCONTINUED | OUTPATIENT
Start: 2020-12-06 | End: 2020-12-07

## 2020-12-06 RX ORDER — OXYCODONE HYDROCHLORIDE 5 MG/1
10 TABLET ORAL EVERY 4 HOURS
Refills: 0 | Status: DISCONTINUED | OUTPATIENT
Start: 2020-12-06 | End: 2020-12-07

## 2020-12-06 RX ORDER — HYDROMORPHONE HYDROCHLORIDE 2 MG/ML
0.5 INJECTION INTRAMUSCULAR; INTRAVENOUS; SUBCUTANEOUS EVERY 4 HOURS
Refills: 0 | Status: DISCONTINUED | OUTPATIENT
Start: 2020-12-06 | End: 2020-12-07

## 2020-12-06 RX ORDER — OXYCODONE HYDROCHLORIDE 5 MG/1
5 TABLET ORAL EVERY 4 HOURS
Refills: 0 | Status: DISCONTINUED | OUTPATIENT
Start: 2020-12-06 | End: 2020-12-07

## 2020-12-06 RX ORDER — HYDROMORPHONE HYDROCHLORIDE 2 MG/ML
0.5 INJECTION INTRAMUSCULAR; INTRAVENOUS; SUBCUTANEOUS
Refills: 0 | Status: DISCONTINUED | OUTPATIENT
Start: 2020-12-06 | End: 2020-12-07

## 2020-12-06 RX ADMIN — SODIUM CHLORIDE 100 MILLILITER(S): 9 INJECTION, SOLUTION INTRAVENOUS at 02:22

## 2020-12-06 RX ADMIN — OXYCODONE HYDROCHLORIDE 10 MILLIGRAM(S): 5 TABLET ORAL at 17:11

## 2020-12-06 RX ADMIN — Medication 1 APPLICATION(S): at 22:43

## 2020-12-06 RX ADMIN — ONDANSETRON 4 MILLIGRAM(S): 8 TABLET, FILM COATED ORAL at 07:42

## 2020-12-06 RX ADMIN — OXYCODONE HYDROCHLORIDE 10 MILLIGRAM(S): 5 TABLET ORAL at 08:07

## 2020-12-06 RX ADMIN — OXYCODONE HYDROCHLORIDE 10 MILLIGRAM(S): 5 TABLET ORAL at 22:46

## 2020-12-06 RX ADMIN — OXYCODONE HYDROCHLORIDE 10 MILLIGRAM(S): 5 TABLET ORAL at 07:07

## 2020-12-06 RX ADMIN — HYDROMORPHONE HYDROCHLORIDE 0.5 MILLIGRAM(S): 2 INJECTION INTRAMUSCULAR; INTRAVENOUS; SUBCUTANEOUS at 02:40

## 2020-12-06 RX ADMIN — Medication 1 APPLICATION(S): at 07:01

## 2020-12-06 RX ADMIN — BUPROPION HYDROCHLORIDE 300 MILLIGRAM(S): 150 TABLET, EXTENDED RELEASE ORAL at 17:05

## 2020-12-06 RX ADMIN — HYDROMORPHONE HYDROCHLORIDE 0.5 MILLIGRAM(S): 2 INJECTION INTRAMUSCULAR; INTRAVENOUS; SUBCUTANEOUS at 02:22

## 2020-12-06 RX ADMIN — ATORVASTATIN CALCIUM 20 MILLIGRAM(S): 80 TABLET, FILM COATED ORAL at 21:46

## 2020-12-06 RX ADMIN — BICTEGRAVIR SODIUM, EMTRICITABINE, AND TENOFOVIR ALAFENAMIDE FUMARATE 1 TABLET(S): 30; 120; 15 TABLET ORAL at 17:05

## 2020-12-06 RX ADMIN — OXYCODONE HYDROCHLORIDE 10 MILLIGRAM(S): 5 TABLET ORAL at 18:00

## 2020-12-06 RX ADMIN — Medication 100 MILLIGRAM(S): at 17:05

## 2020-12-06 RX ADMIN — OXYCODONE HYDROCHLORIDE 10 MILLIGRAM(S): 5 TABLET ORAL at 21:46

## 2020-12-06 NOTE — BRIEF OPERATIVE NOTE - NSICDXBRIEFPROCEDURE_GEN_ALL_CORE_FT
PROCEDURES:  Open reduction and internal fixation of fracture of shaft of ulnar 06-Dec-2020 10:31:01  Rebecca Emerson  Arthroplasty of radial head 06-Dec-2020 10:29:03  Rebecca Emerson

## 2020-12-06 NOTE — PROGRESS NOTE ADULT - ASSESSMENT
60M w h/o HIV (biktarvy - undetectable VL 10/2020), tobacco use on wellbutrin, p/w mechanical fall onto R elbow found to have R Monteggia fracture dislocation and CLEMENTE, now s/p R radial head arthroplasty w Dr. Palomo 12/6 - POD0    #Post-op state - pain controlled. PPx: Ambulatory. IS at bedside.   #R Elbow fx - Monteggia fx dislocation - mgmt per orthopedics  #CLEMENTE - Improving Cr 1.11 from 1.44  #HIV - c/w biktarvy  #HLD - c/w atorva 20  #Tobacco use d/o - still w 5cig/d c/w wellbuttrin 300 XL  #Asthma - intermittent. Uses duonebs PRN but has not recently.    Recommendations  If pt dc, recommend checking BMP in 1wk and f/u w PMD  Otherwise BMP in AM; Avoid NSAID/ACEi/ARBs    DISPO: Home. Optimized from medical standpoint

## 2020-12-06 NOTE — CONSULT NOTE ADULT - ASSESSMENT
60-year-old M with history of smoking and HIV (~20 years history on Biktarvy, undetectable viral load as of 10/26/2020) with right elbow fracture to undergo right elbow ORIF with possible right radial ehad arthroplasty; medicine consulted for pre-operative risk assessment.     #Pre-operative risk assessment. Patient to be evaluated for right elbow ORIF with possible radial head arthoplasty. RCRI score 0 (class I risk) for pre-operative treatment with insulin (3.9% risk of MI, cardiac arrest, or death 30 days after MI). Reed score 0.1% risk of MI or cardiac arrest intra-operatively or 30 days s/p procedure.   - patient low-risk for intermediate risk procedure    #R/o CLEMENTE. Patient with creatinine of 0.98 back in 2018, now presents with creatinine of 1.44. Unclear if this is chronic (with CKD) or if CLEMENTE.   - no NSAIDs   - no ACE/ARB   - obtain urine lytes (urine creatinine, urine sodium, urine osm, and urine BUN)     #Right elbow fracture. Patient fractured elbow after mechanical fall. At the time, denies gait unsteadiness, dizziness.   - c/w current pain regimen   - NPO for surgery tomorrow AM     #HIV. On Biktarvy at home. CD4 and VL last assessed in October 2020; patient cannot remember CD4 but VL at the time non-detectable   - c/w biktarvy qd   - c/w crestor 10 mg daily     #Active smoker. On buproprion 300 mg daily for smoking cessation. Has history of smoking since 10; used to smoke ppd but now has cut down to 5 cigarettes daily   - c/w bupropion 300 mg daily     F- None  E-replete as needed  N- NPO  DVt ppx: scds

## 2020-12-06 NOTE — CONSULT NOTE ADULT - SUBJECTIVE AND OBJECTIVE BOX
JAYNESANDRA  60y  Male      Patient is a 60y old  Male who presents with a chief complaint of R Elbow Pain (06 Dec 2020 02:26)        PAST MEDICAL/SURGICAL HISTORY  PAST MEDICAL & SURGICAL HISTORY:  Human immunodeficiency virus (HIV) infection    No significant past surgical history        REVIEW OF SYSTEMS:  CONSTITUTIONAL: No fever, weight loss, or fatigue  EYES: No eye pain, visual disturbances, or discharge  ENMT:  No difficulty hearing, tinnitus, vertigo; No sinus or throat pain  NECK: No pain or stiffness  BREASTS: No pain, masses, or nipple discharge  RESPIRATORY: No cough, wheezing, chills or hemoptysis; No shortness of breath  CARDIOVASCULAR: No chest pain, palpitations, dizziness, or leg swelling  GASTROINTESTINAL: No abdominal or epigastric pain. No nausea, vomiting, or hematemesis; No diarrhea or constipation. No melena or hematochezia.  GENITOURINARY: No dysuria, frequency, hematuria, or incontinence  NEUROLOGICAL: No headaches, memory loss, loss of strength, numbness, or tremors  SKIN: No itching, burning, rashes, or lesions   LYMPH NODES: No enlarged glands  ENDOCRINE: No heat or cold intolerance; No hair loss  MUSCULOSKELETAL: No joint pain or swelling; No muscle, back, or extremity pain  PSYCHIATRIC: No depression, anxiety, mood swings, or difficulty sleeping  HEME/LYMPH: No easy bruising, or bleeding gums  ALLERY AND IMMUNOLOGIC: No hives or eczema    T(C): 36.4 (12-06-20 @ 00:46), Max: 36.9 (12-05-20 @ 18:54)  HR: 57 (12-06-20 @ 00:46) (56 - 60)  BP: 150/90 (12-06-20 @ 00:46) (150/90 - 165/91)  RR: 16 (12-06-20 @ 00:46) (16 - 16)  SpO2: 95% (12-06-20 @ 00:46) (94% - 99%)  Wt(kg): --Vital Signs Last 24 Hrs  T(C): 36.4 (06 Dec 2020 00:46), Max: 36.9 (05 Dec 2020 18:54)  T(F): 97.5 (06 Dec 2020 00:46), Max: 98.4 (05 Dec 2020 18:54)  HR: 57 (06 Dec 2020 00:46) (56 - 60)  BP: 150/90 (06 Dec 2020 00:46) (150/90 - 165/91)  BP(mean): --  RR: 16 (06 Dec 2020 00:46) (16 - 16)  SpO2: 95% (06 Dec 2020 00:46) (94% - 99%)    PHYSICAL EXAM:  GENERAL: NAD, well-groomed, well-developed  HEAD:  Atraumatic, Normocephalic  EYES: EOMI, PERRLA, conjunctiva and sclera clear  ENMT:  Moist mucous membranes  NECK: Supple, No JVD   NERVOUS SYSTEM:  Alert & Oriented X3, Good concentration; Motor Strength 5/5 B/L ower extremities; 5/5 in left upper extremity; examination of motor strength of right extremity limited by pain; sensation in tact in all 4 extremities   CHEST/LUNG: Clear to auscultation bilaterally; No rales, rhonchi, wheezing, or rubs  HEART: Regular rate and rhythm; No murmurs, rubs, or gallops  ABDOMEN: Soft, Nontender, Nondistended; Bowel sounds present  EXTREMITIES:  2+ Peripheral Pulses, No clubbing, cyanosis, or edema; right elbow wrapped      LABS:  CBC   12-05-20 @ 19:59  Hematcorit 44.9  Hemoglobin 15.7  Mean Cell Hemoglobin 33.1  Platelet Count-Automated 250  RBC Count 4.74  Red Cell Distrib Width 13.2  Wbc Count 9.71      BMP  12-05-20 @ 19:59  Anion Gap. Serum 9  Blood Urea Nitrogen,Serm 25  Calcium, Total Serum 10.0  Carbon Dioxide, Serum 29  Chloride, Serum 102  Creatinine, Serum 1.44  eGFR in  60  eGFR in Non Afican American 52  Gloucose, serum 118  Potassium, Serum 3.9  Sodium, Serum 140                  CMP  12-05-20 @ 19:59  Angelia Aminotransferase(ALT/SGPT)32  Albumin, Serum 3.6  Alkaline Phosphatase, Serum 108  Anion Gap, Serum 9  Aspartate Aminotransferase (AST/SGOT)24  Bilirubin Total, Serum 0.3  Blood Urea Nitrogen, Serum 25  Calcium,Total Serum 10.0  Carbon Dioxide, Serum 29  Chloride, Serum 102  Creatinine, Serum 1.44  eGFR if  60  eGFR if Non African American 52  Glucose, Serum 118  Potassium, Serum 3.9  Protein Total, Serum 7.2  Sodium, Serum 140                          PT/INR  PT/INR  12-05-20 @ 19:59  INR 0.98  Prothrombin Time Comment --  Prothrobin Time, [id number]      Amylase/Lipase            RADIOLOGY & ADDITIONAL TESTS: Reviewed SANDRA GODOY  60y  Male      Patient is a 60y old  Male who presents with a chief complaint of R Elbow Pain (06 Dec 2020 02:26)    60-year-old M with history of smoking and HIV (~20 years history on Biktarvy, undetectable viral load as of 10/26/2020) presents after having a mechanical fall landing on his right elbow. Patient was walking when he had a mechanical fall. Patient does not know how he fell. He does not recall what caused him to fall, but he fell on an outstretched right arm, resulting in immediate elbow pain. After the fall, the patient was unable to move the elbow. He denies head trauma or loss of consciousness. Of note, the patient had left distal radius ORIF in 2018, also for a fall. On ROS, the patient denies headache, vision changes, gait instability, CP, SOB, palpitations, abdominal pain, n/v/d/c, dysuria, fever/chills, and appetite loss. He states to have mild right arm pain after receiving pain medication.     In the ED, temperature 98.4F, HR 58, /91, RR 16 with spO2 96%. Labs were s/f for BUN/cre of 25/1.44, trace blood in UA, CT head without acute pathology, and CT maxillofacial without acute fracture. XRay and CT of the right elbow shoed R Monteggia fracture dislocation. In the ED, he received morphine 4 mg IV x1, dilaudid 0.5 mg IV x1, and LR at 100 cc/hr. Ortho to perform right elbow ORIF with possible radial head arthroplasty; medicine consulted for pre-operative risk assessment.         PAST MEDICAL/SURGICAL HISTORY  PAST MEDICAL & SURGICAL HISTORY:  Human immunodeficiency virus (HIV) infection    No significant past surgical history      Family history: diabetes in family, parents  of cancer   Social History: smokes cigarettes daily (5) and started at 10. Occasional alcohol use. Uses marijuana.         REVIEW OF SYSTEMS: as above    T(C): 36.4 (20 @ 00:46), Max: 36.9 (20 @ 18:54)  HR: 57 (20 @ 00:46) (56 - 60)  BP: 150/90 (20 @ 00:46) (150/90 - 165/91)  RR: 16 (20 @ 00:46) (16 - 16)  SpO2: 95% (20 @ 00:46) (94% - 99%)  Wt(kg): --Vital Signs Last 24 Hrs  T(C): 36.4 (06 Dec 2020 00:46), Max: 36.9 (05 Dec 2020 18:54)  T(F): 97.5 (06 Dec 2020 00:46), Max: 98.4 (05 Dec 2020 18:54)  HR: 57 (06 Dec 2020 00:46) (56 - 60)  BP: 150/90 (06 Dec 2020 00:46) (150/90 - 165/91)  BP(mean): --  RR: 16 (06 Dec 2020 00:46) (16 - 16)  SpO2: 95% (06 Dec 2020 00:46) (94% - 99%)    PHYSICAL EXAM:  GENERAL: NAD, well-groomed, well-developed  HEAD:  Atraumatic, Normocephalic  EYES: EOMI, PERRLA, conjunctiva and sclera clear  ENMT:  Moist mucous membranes  NECK: Supple, No JVD   NERVOUS SYSTEM:  Alert & Oriented X3, Good concentration; Motor Strength 5/5 B/L ower extremities; 5/5 in left upper extremity; examination of motor strength of right extremity limited by pain; sensation in tact in all 4 extremities   CHEST/LUNG: Clear to auscultation bilaterally; No rales, rhonchi, wheezing, or rubs  HEART: Regular rate and rhythm; No murmurs, rubs, or gallops  ABDOMEN: Soft, Nontender, Nondistended; Bowel sounds present  EXTREMITIES:  2+ Peripheral Pulses, No clubbing, cyanosis, or edema; right elbow wrapped      LABS:  CBC   20 @ 19:59  Hematcorit 44.9  Hemoglobin 15.7  Mean Cell Hemoglobin 33.1  Platelet Count-Automated 250  RBC Count 4.74  Red Cell Distrib Width 13.2  Wbc Count 9.71      BMP  20 @ 19:59  Anion Gap. Serum 9  Blood Urea Nitrogen,Serm 25  Calcium, Total Serum 10.0  Carbon Dioxide, Serum 29  Chloride, Serum 102  Creatinine, Serum 1.44  eGFR in  60  eGFR in Non Afican American 52  Gloucose, serum 118  Potassium, Serum 3.9  Sodium, Serum 140                  CMP  20 @ 19:59  Angelia Aminotransferase(ALT/SGPT)32  Albumin, Serum 3.6  Alkaline Phosphatase, Serum 108  Anion Gap, Serum 9  Aspartate Aminotransferase (AST/SGOT)24  Bilirubin Total, Serum 0.3  Blood Urea Nitrogen, Serum 25  Calcium,Total Serum 10.0  Carbon Dioxide, Serum 29  Chloride, Serum 102  Creatinine, Serum 1.44  eGFR if  60  eGFR if Non African American 52  Glucose, Serum 118  Potassium, Serum 3.9  Protein Total, Serum 7.2  Sodium, Serum 140                          PT/INR  PT/INR  20 @ 19:59  INR 0.98  Prothrombin Time Comment --  Prothrobin Time, [id number]      Amylase/Lipase            RADIOLOGY & ADDITIONAL TESTS: Reviewed

## 2020-12-06 NOTE — PROGRESS NOTE ADULT - SUBJECTIVE AND OBJECTIVE BOX
INTERVAL HPI/OVERNIGHT EVENTS: IAN O/N    SUBJECTIVE: Patient seen and examined at bedside.   Pt seen post - op. Pt wo complaints and states pain in elbow is controlled. Would prefer to return home Monday. Denies numbness, chest pain, fever, dyspnea.     Denies any decreased PO intake, GI losses, use of NSAIDs prior to admission/fall.     OBJECTIVE:    VITAL SIGNS:  ICU Vital Signs Last 24 Hrs  T(C): 36.6 (06 Dec 2020 15:12), Max: 36.6 (05 Dec 2020 21:29)  T(F): 97.8 (06 Dec 2020 15:12), Max: 97.9 (05 Dec 2020 21:29)  HR: 81 (06 Dec 2020 15:12) (55 - 81)  BP: 127/79 (06 Dec 2020 15:12) (127/79 - 171/76)  BP(mean): 105 (06 Dec 2020 11:39) (101 - 110)  ABP: --  ABP(mean): --  RR: 17 (06 Dec 2020 15:12) (13 - 20)  SpO2: 95% (06 Dec 2020 15:12) (93% - 99%)         @ 07:  -  06 @ 07:00  --------------------------------------------------------  IN: 0 mL / OUT: 600 mL / NET: -600 mL     @ 07:  -   @ 19:05  --------------------------------------------------------  IN: 200 mL / OUT: 580 mL / NET: -380 mL      CAPILLARY BLOOD GLUCOSE          PHYSICAL EXAM:  GEN: Male in NAD on RA  HEENT: NC/AT, MMM  CV: RRR, no BLE edema  PULM: Nml effort, CTAB  ABD: Soft, NABS, non-tender  NEURO: A/O x3, moving all ext  EXT: R arm in cast  PSYCH: Appropriate conversant    MEDICATIONS:  MEDICATIONS  (STANDING):  atorvastatin 20 milliGRAM(s) Oral at bedtime  bictegravir 50 mG/emtricitabine 200 mG/tenofovir alafenamide 25 mG (BIKTARVY) 1 Tablet(s) Oral daily  buPROPion XL . 300 milliGRAM(s) Oral daily  ceFAZolin   IVPB 2000 milliGRAM(s) IV Intermittent every 8 hours  lactated ringers. 1000 milliLiter(s) (100 mL/Hr) IV Continuous <Continuous>    MEDICATIONS  (PRN):  HYDROmorphone  Injectable 0.5 milliGRAM(s) IV Push every 4 hours PRN Breakthrough  HYDROmorphone  Injectable 0.5 milliGRAM(s) IV Push every 15 minutes PRN Severe Pain (7 - 10)  oxyCODONE    IR 10 milliGRAM(s) Oral every 4 hours PRN Severe Pain (7 - 10)  oxyCODONE    IR 5 milliGRAM(s) Oral every 4 hours PRN Moderate Pain (4 - 6)      ALLERGIES:  Allergies    sulfa drugs (Fever)    Intolerances        LABS:                        14.4   9.09  )-----------( 189      ( 06 Dec 2020 06:39 )             43.6     12-06    138  |  105  |  19  ----------------------------<  90  3.8   |  23  |  1.11    Ca    9.2      06 Dec 2020 06:39    TPro  7.2  /  Alb  3.6  /  TBili  0.3  /  DBili  x   /  AST  24  /  ALT  32  /  AlkPhos  108  12-05    PT/INR - ( 05 Dec 2020 19:59 )   PT: 11.8 sec;   INR: 0.98          PTT - ( 05 Dec 2020 19:59 )  PTT:29.0 sec  Urinalysis Basic - ( 06 Dec 2020 00:56 )    Color: Yellow / Appearance: Clear / S.020 / pH: x  Gluc: x / Ketone: NEGATIVE mg/dL  / Bili: Negative / Urobili: 0.2 E.U./dL   Blood: x / Protein: NEGATIVE mg/dL / Nitrite: NEGATIVE   Leuk Esterase: NEGATIVE / RBC: < 5 /HPF / WBC < 5 /HPF   Sq Epi: x / Non Sq Epi: 0-5 /HPF / Bacteria: None /HPF        RADIOLOGY & ADDITIONAL TESTS: Reviewed.

## 2020-12-06 NOTE — PROGRESS NOTE ADULT - SUBJECTIVE AND OBJECTIVE BOX
Ortho Post Op Check    Procedure: R Monteggia ORIF w/ radial head replacement  Surgeon: Dewey Vyas comfortable without complaints, pain controlled. Denies CP, SOB, N/V, numbness/tingling in R hand      Vital Signs Last 24 Hrs  T(C): 36.3 (12-06-20 @ 10:54), Max: 36.3 (12-06-20 @ 10:54)  T(F): 97.4 (12-06-20 @ 10:54), Max: 97.4 (12-06-20 @ 10:54)  HR: 59 (12-06-20 @ 11:54) (59 - 72)  BP: 140/70 (12-06-20 @ 11:54) (137/78 - 171/76)  BP(mean): 105 (12-06-20 @ 11:39) (101 - 110)  RR: 13 (12-06-20 @ 11:54) (13 - 20)  SpO2: 94% (12-06-20 @ 11:54) (94% - 99%)    Physical Exam:  General: Resting comfortably in bed. AAOx3. NAD.  Extremities:        LUE: No gross deformity. SILT C5-T1 distribution, symmetric. AIN/PIN/U motor intact. WWP, radial pulse 2+       RUE: Splint in place. Fingers SILT, symmetrical. AIN/PIN/U motor intact. WWP, radial pulse 2+, cap refill <3 sec         Labs:                        14.4   9.09  )-----------( 189      ( 06 Dec 2020 06:39 )             43.6   06 Dec 2020 06:39    138    |  105    |  19     ----------------------------<  90     3.8     |  23     |  1.11       TPro  7.2    /  Alb  3.6    /  TBili  0.3    /  DBili  x      /  AST  24     /  ALT  32     /  AlkPhos  108    05 Dec 2020 19:59      Post-op X-Ray: Hardware in place with fracture reduction, radial head arthroplasty     A/P: 60yMale POD#0 s/p R Monteggia ORIF,   - Stable  - Pain Control  - DVT ppx: ASA  - Post op abx: Ancef  - PT, WBS: NWB RUE  - Dispo: May go home today    Ortho Pager [phone]

## 2020-12-06 NOTE — BRIEF OPERATIVE NOTE - NSICDXBRIEFPOSTOP_GEN_ALL_CORE_FT
POST-OP DIAGNOSIS:  Monteggia fracture, closed 06-Dec-2020 10:31:20 Monteggia fracture, closed Rebecca Emerson

## 2020-12-06 NOTE — H&P ADULT - NSHPPHYSICALEXAM_GEN_ALL_CORE
General: AAOx3, NAD  RUE: Significant ecchymosis and deformity noted over R elbow  Mild tenting over posterior aspect of proximal ulna  Skin grossly intact, no open fracture  SILT distally M/R/U  Motor 5/5 AIN/PIN/U distally  Pulses 2+ distally

## 2020-12-06 NOTE — BRIEF OPERATIVE NOTE - NSICDXBRIEFPREOP_GEN_ALL_CORE_FT
PRE-OP DIAGNOSIS:  Monteggia fracture, closed 06-Dec-2020 10:31:09 Monteggia fracture, closed Rebecca Emerson

## 2020-12-06 NOTE — H&P ADULT - PROBLEM SELECTOR PLAN 1
Admit to orthopedics regional floor under Dr. Palomo  - Plan for OR today for R Elbow ORIF possible radial head arthroplasty  - Reduced and splinted at bedside upon arrival from [facility]  - NPO for OR  - Medicine clearance  - Preop labs  - AJ MEIER

## 2020-12-06 NOTE — H&P ADULT - HISTORY OF PRESENT ILLNESS
60M PMHx of HIV (x20 years, on Biktarvy daily, undetectable viral load) presenting after a ground level fall yesterday evening. Pt fell on outstretched R arm and felt immediate pain and deformity in R elbow. Unable to move elbow afterwards. Pt also hit his head but denies LOC. Was taken to [facility] where he was found to have a R monteggia fx dislocation, as well as superficial lacerations and abrasions of his knee and face. Pt was placed in a splint and taken to [facility] for further care.

## 2020-12-07 ENCOUNTER — TRANSCRIPTION ENCOUNTER (OUTPATIENT)
Age: 60
End: 2020-12-07

## 2020-12-07 VITALS
SYSTOLIC BLOOD PRESSURE: 122 MMHG | DIASTOLIC BLOOD PRESSURE: 61 MMHG | RESPIRATION RATE: 16 BRPM | OXYGEN SATURATION: 95 % | TEMPERATURE: 98 F | HEART RATE: 66 BPM

## 2020-12-07 PROCEDURE — 73070 X-RAY EXAM OF ELBOW: CPT

## 2020-12-07 PROCEDURE — 80048 BASIC METABOLIC PNL TOTAL CA: CPT

## 2020-12-07 PROCEDURE — 85610 PROTHROMBIN TIME: CPT

## 2020-12-07 PROCEDURE — 12001 RPR S/N/AX/GEN/TRNK 2.5CM/<: CPT

## 2020-12-07 PROCEDURE — 96374 THER/PROPH/DIAG INJ IV PUSH: CPT | Mod: XU

## 2020-12-07 PROCEDURE — 36415 COLL VENOUS BLD VENIPUNCTURE: CPT

## 2020-12-07 PROCEDURE — 81001 URINALYSIS AUTO W/SCOPE: CPT

## 2020-12-07 PROCEDURE — 80053 COMPREHEN METABOLIC PANEL: CPT

## 2020-12-07 PROCEDURE — 86901 BLOOD TYPING SEROLOGIC RH(D): CPT

## 2020-12-07 PROCEDURE — C1713: CPT

## 2020-12-07 PROCEDURE — 99233 SBSQ HOSP IP/OBS HIGH 50: CPT | Mod: GC

## 2020-12-07 PROCEDURE — 90471 IMMUNIZATION ADMIN: CPT | Mod: XU

## 2020-12-07 PROCEDURE — C1769: CPT

## 2020-12-07 PROCEDURE — 86900 BLOOD TYPING SEROLOGIC ABO: CPT

## 2020-12-07 PROCEDURE — 99285 EMERGENCY DEPT VISIT HI MDM: CPT | Mod: 25

## 2020-12-07 PROCEDURE — 70486 CT MAXILLOFACIAL W/O DYE: CPT

## 2020-12-07 PROCEDURE — 73080 X-RAY EXAM OF ELBOW: CPT

## 2020-12-07 PROCEDURE — 70450 CT HEAD/BRAIN W/O DYE: CPT

## 2020-12-07 PROCEDURE — 93005 ELECTROCARDIOGRAM TRACING: CPT | Mod: XU

## 2020-12-07 PROCEDURE — 73200 CT UPPER EXTREMITY W/O DYE: CPT

## 2020-12-07 PROCEDURE — 90715 TDAP VACCINE 7 YRS/> IM: CPT

## 2020-12-07 PROCEDURE — 85730 THROMBOPLASTIN TIME PARTIAL: CPT

## 2020-12-07 PROCEDURE — 96376 TX/PRO/DX INJ SAME DRUG ADON: CPT | Mod: XU

## 2020-12-07 PROCEDURE — 87635 SARS-COV-2 COVID-19 AMP PRB: CPT

## 2020-12-07 PROCEDURE — 86803 HEPATITIS C AB TEST: CPT

## 2020-12-07 PROCEDURE — 86850 RBC ANTIBODY SCREEN: CPT

## 2020-12-07 PROCEDURE — 85025 COMPLETE CBC W/AUTO DIFF WBC: CPT

## 2020-12-07 PROCEDURE — C1776: CPT

## 2020-12-07 RX ORDER — OXYCODONE HYDROCHLORIDE 5 MG/1
1 TABLET ORAL
Qty: 30 | Refills: 0
Start: 2020-12-07

## 2020-12-07 RX ORDER — ROSUVASTATIN CALCIUM 5 MG/1
1 TABLET ORAL
Qty: 0 | Refills: 0 | DISCHARGE

## 2020-12-07 RX ORDER — BICTEGRAVIR SODIUM, EMTRICITABINE, AND TENOFOVIR ALAFENAMIDE FUMARATE 30; 120; 15 MG/1; MG/1; MG/1
1 TABLET ORAL
Qty: 0 | Refills: 0 | DISCHARGE

## 2020-12-07 RX ORDER — BUPROPION HYDROCHLORIDE 150 MG/1
0 TABLET, EXTENDED RELEASE ORAL
Qty: 0 | Refills: 2 | DISCHARGE

## 2020-12-07 RX ORDER — ASPIRIN/CALCIUM CARB/MAGNESIUM 324 MG
1 TABLET ORAL
Qty: 14 | Refills: 0
Start: 2020-12-07

## 2020-12-07 RX ADMIN — Medication 1 APPLICATION(S): at 06:28

## 2020-12-07 RX ADMIN — OXYCODONE HYDROCHLORIDE 10 MILLIGRAM(S): 5 TABLET ORAL at 02:21

## 2020-12-07 RX ADMIN — OXYCODONE HYDROCHLORIDE 10 MILLIGRAM(S): 5 TABLET ORAL at 10:36

## 2020-12-07 RX ADMIN — OXYCODONE HYDROCHLORIDE 10 MILLIGRAM(S): 5 TABLET ORAL at 07:28

## 2020-12-07 RX ADMIN — OXYCODONE HYDROCHLORIDE 10 MILLIGRAM(S): 5 TABLET ORAL at 06:28

## 2020-12-07 RX ADMIN — Medication 100 MILLIGRAM(S): at 00:58

## 2020-12-07 RX ADMIN — OXYCODONE HYDROCHLORIDE 10 MILLIGRAM(S): 5 TABLET ORAL at 03:20

## 2020-12-07 NOTE — DISCHARGE NOTE PROVIDER - NSDCMRMEDTOKEN_GEN_ALL_CORE_FT
Biktarvy oral tablet: 1 tab(s) orally once a day  Crestor 5 mg oral tablet: 1 tab(s) orally once a day   Aspirin Enteric Coated 81 mg oral delayed release tablet: 1 tab(s) orally once a day   Biktarvy oral tablet: 1 tab(s) orally once a day  BUPROPION HCL  MG TABLET: take 1 tablet by mouth once daily  Crestor 5 mg oral tablet: 1 tab(s) orally once a day  oxyCODONE 5 mg oral tablet: 1-2 tab(s) orally every 4 to 6 hours, As Needed -Moderate to severe pain MDD:6

## 2020-12-07 NOTE — DISCHARGE NOTE PROVIDER - NSDCCPCAREPLAN_GEN_ALL_CORE_FT
PRINCIPAL DISCHARGE DIAGNOSIS  Diagnosis: Monteggia fracture, closed  Assessment and Plan of Treatment:       SECONDARY DISCHARGE DIAGNOSES  Diagnosis: Fall  Assessment and Plan of Treatment:

## 2020-12-07 NOTE — DISCHARGE NOTE PROVIDER - NSDCCPTREATMENT_GEN_ALL_CORE_FT
PRINCIPAL PROCEDURE  Procedure: Open reduction and internal fixation of fracture of shaft of ulnar  Findings and Treatment:       SECONDARY PROCEDURE  Procedure: Arthroplasty of radial head  Findings and Treatment:

## 2020-12-07 NOTE — PROGRESS NOTE ADULT - SUBJECTIVE AND OBJECTIVE BOX
OVERNIGHT EVENTS: IAN    SUBJECTIVE / INTERVAL HPI: Patient seen and examined at bedside. Getting dressed for discharge. Pain is controlled. No complaints, asking if he has a twitch.     VITAL SIGNS:  Vital Signs Last 24 Hrs  T(C): 36.6 (07 Dec 2020 08:38), Max: 37.4 (07 Dec 2020 06:24)  T(F): 97.9 (07 Dec 2020 08:38), Max: 99.4 (07 Dec 2020 06:24)  HR: 66 (07 Dec 2020 08:38) (59 - 81)  BP: 122/61 (07 Dec 2020 08:38) (122/61 - 171/76)  BP(mean): 97 (07 Dec 2020 06:24) (91 - 110)  RR: 16 (07 Dec 2020 08:38) (13 - 20)  SpO2: 95% (07 Dec 2020 08:38) (93% - 99%)    PHYSICAL EXAM:    General: WDWN, pleasant  HEENT: NC/AT  Neck: supple  Cardiovascular: +S1/S2; RRR  Respiratory: CTA b/l; no W/R/R  Gastrointestinal: soft, NT/ND; +BSx4  Extremities: R arm wrapped in ACE bandage, unable to examine    MEDICATIONS:  MEDICATIONS  (STANDING):  atorvastatin 20 milliGRAM(s) Oral at bedtime  BACItracin   Ointment 1 Application(s) Topical two times a day  bictegravir 50 mG/emtricitabine 200 mG/tenofovir alafenamide 25 mG (BIKTARVY) 1 Tablet(s) Oral daily  buPROPion XL . 300 milliGRAM(s) Oral daily  lactated ringers. 1000 milliLiter(s) (100 mL/Hr) IV Continuous <Continuous>    MEDICATIONS  (PRN):  HYDROmorphone  Injectable 0.5 milliGRAM(s) IV Push every 4 hours PRN Breakthrough  HYDROmorphone  Injectable 0.5 milliGRAM(s) IV Push every 15 minutes PRN Severe Pain (7 - 10)  oxyCODONE    IR 10 milliGRAM(s) Oral every 4 hours PRN Severe Pain (7 - 10)  oxyCODONE    IR 5 milliGRAM(s) Oral every 4 hours PRN Moderate Pain (4 - 6)      ALLERGIES:  Allergies    sulfa drugs (Fever)    Intolerances        LABS:                        14.4   9.09  )-----------( 189      ( 06 Dec 2020 06:39 )             43.6     12-    138  |  105  |  19  ----------------------------<  90  3.8   |  23  |  1.11    Ca    9.2      06 Dec 2020 06:39    TPro  7.2  /  Alb  3.6  /  TBili  0.3  /  DBili  x   /  AST  24  /  ALT  32  /  AlkPhos  108  12-05    PT/INR - ( 05 Dec 2020 19:59 )   PT: 11.8 sec;   INR: 0.98          PTT - ( 05 Dec 2020 19:59 )  PTT:29.0 sec  Urinalysis Basic - ( 06 Dec 2020 00:56 )    Color: Yellow / Appearance: Clear / S.020 / pH: x  Gluc: x / Ketone: NEGATIVE mg/dL  / Bili: Negative / Urobili: 0.2 E.U./dL   Blood: x / Protein: NEGATIVE mg/dL / Nitrite: NEGATIVE   Leuk Esterase: NEGATIVE / RBC: < 5 /HPF / WBC < 5 /HPF   Sq Epi: x / Non Sq Epi: 0-5 /HPF / Bacteria: None /HPF      CAPILLARY BLOOD GLUCOSE          RADIOLOGY & ADDITIONAL TESTS: Reviewed.    ASSESSMENT:    PLAN:

## 2020-12-07 NOTE — DISCHARGE NOTE PROVIDER - NSDCFUADDINST_GEN_ALL_CORE_FT
Nonweight bearing right upper extremity   No strenuous activity, heavy lifting, driving or returning to work until cleared by MD.  Keep splint clean/dry in place until follow up with Dr. Palomo   Try to have regular bowel movements, take stool softener or laxative if necessary.  May take Pepcid or Zantac for upset stomach.  May take Aleve or Naproxen instead of Meloxicam/Celebrex.  Swelling may travel all the way down leg to foot, this is normal and will subside in a few weeks.  Call to schedule an appt with  _________ for follow up, if you have staples or sutures they will be removed in office.  Contact your doctor if you experience: fever greater than 101.5, chills, chest pain, difficulty breathing, redness or excessive drainage around the incision, other concerns.  Follow up with your primary care provider.

## 2020-12-07 NOTE — DISCHARGE NOTE PROVIDER - CARE PROVIDER_API CALL
Davide Palomo)  Orthopaedic Surgery; Sports Medicine  [address] [place] [address]  Phone: [phone]  Fax: ()-  Follow Up Time: 2 weeks

## 2020-12-07 NOTE — DISCHARGE NOTE NURSING/CASE MANAGEMENT/SOCIAL WORK - PATIENT PORTAL LINK FT
You can access the FollowMyHealth Patient Portal offered by [facility] by registering at the following website: [email or web address]. By joining [employer]’s FollowMyHealth portal, you will also be able to view your health information using other applications (apps) compatible with our system.

## 2020-12-07 NOTE — PROGRESS NOTE ADULT - SUBJECTIVE AND OBJECTIVE BOX
SUBJECTIVE: Patient seen and examined. Reports surgical site pain but otherwise pain controlled.  No issues overnight. No HA, f/c/n/v/cp/sob.     OBJECTIVE:  NAD  Vital Signs Last 24 Hrs  T(C): 37.4 (07 Dec 2020 06:24), Max: 37.4 (07 Dec 2020 06:24)  T(F): 99.4 (07 Dec 2020 06:24), Max: 99.4 (07 Dec 2020 06:24)  HR: 59 (07 Dec 2020 06:24) (59 - 81)  BP: 143/74 (07 Dec 2020 06:24) (127/79 - 171/76)  BP(mean): 97 (07 Dec 2020 06:24) (91 - 110)  RR: 16 (07 Dec 2020 06:24) (13 - 20)  SpO2: 93% (07 Dec 2020 06:24) (93% - 99%)    Physical Exam:   General: Resting comfortably, NAD  RUE: Splint in place. AIN/PIN/U motor intact. Fingers SILT, cap refill <3 sec    Labs               14.4   9.09  )-----------( 189      ( 06 Dec 2020 06:39 )             43.6     12-06    138  |  105  |  19  ----------------------------<  90  3.8   |  23  |  1.11    Ca    9.2      06 Dec 2020 06:39    TPro  7.2  /  Alb  3.6  /  TBili  0.3  /  DBili  x   /  AST  24  /  ALT  32  /  AlkPhos  108  12-05    A/P :  Pt is a 59yo Male s/p R ulna ORIF, radial head arthroplasty  -  Pain control  -  DVT ppx: SCDs     -  Weight bearing status: NWB RUE  -  Dispo: Home today

## 2020-12-07 NOTE — DISCHARGE NOTE PROVIDER - HOSPITAL COURSE
Admitted to Orthopedics   Attending: Dr. Palomo   Surgery: s/p ORIF monteggia and radial head replacement   Anais-op Antibiotics  Pain control  DVT prophylaxis  OOB/Physical Therapy   Admitted to Orthopedics   Attending: Dr. Palomo   Surgery: s/p ORIF proximal ulna with radial head replacement   Anais-op Antibiotics  Pain control  DVT prophylaxis- ASA   medicine consult
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
## 2020-12-09 DIAGNOSIS — S52.271A MONTEGGIA'S FRACTURE OF RIGHT ULNA, INITIAL ENCOUNTER FOR CLOSED FRACTURE: ICD-10-CM

## 2020-12-09 DIAGNOSIS — J45.909 UNSPECIFIED ASTHMA, UNCOMPLICATED: ICD-10-CM

## 2020-12-09 DIAGNOSIS — Z21 ASYMPTOMATIC HUMAN IMMUNODEFICIENCY VIRUS [HIV] INFECTION STATUS: ICD-10-CM

## 2020-12-09 DIAGNOSIS — N17.9 ACUTE KIDNEY FAILURE, UNSPECIFIED: ICD-10-CM

## 2020-12-09 DIAGNOSIS — Z88.2 ALLERGY STATUS TO SULFONAMIDES: ICD-10-CM
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
## 2020-12-09 DIAGNOSIS — E78.5 HYPERLIPIDEMIA, UNSPECIFIED: ICD-10-CM
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
## 2020-12-09 DIAGNOSIS — S52.279A MONTEGGIA'S FRACTURE OF UNSPECIFIED ULNA, INITIAL ENCOUNTER FOR CLOSED FRACTURE: ICD-10-CM

## 2020-12-09 DIAGNOSIS — F17.200 NICOTINE DEPENDENCE, UNSPECIFIED, UNCOMPLICATED: ICD-10-CM

## 2021-01-06 ENCOUNTER — APPOINTMENT (OUTPATIENT)
Dept: RADIOLOGY | Facility: CLINIC | Age: 61
End: 2021-01-06

## 2021-01-06 ENCOUNTER — OUTPATIENT (OUTPATIENT)
Dept: OUTPATIENT SERVICES | Facility: HOSPITAL | Age: 61
LOS: 1 days | End: 2021-01-06
Payer: MEDICAID

## 2021-01-06 PROCEDURE — 73080 X-RAY EXAM OF ELBOW: CPT | Mod: 26,RT

## 2023-05-08 ENCOUNTER — EMERGENCY (EMERGENCY)
Facility: HOSPITAL | Age: 63
LOS: 1 days | Discharge: HOME | End: 2023-05-08
Attending: EMERGENCY MEDICINE | Admitting: EMERGENCY MEDICINE
Payer: MEDICAID

## 2023-05-08 VITALS
RESPIRATION RATE: 18 BRPM | DIASTOLIC BLOOD PRESSURE: 82 MMHG | OXYGEN SATURATION: 95 % | WEIGHT: 130.07 LBS | HEART RATE: 69 BPM | TEMPERATURE: 98 F | SYSTOLIC BLOOD PRESSURE: 136 MMHG

## 2023-05-08 VITALS
HEART RATE: 66 BPM | OXYGEN SATURATION: 95 % | RESPIRATION RATE: 16 BRPM | DIASTOLIC BLOOD PRESSURE: 82 MMHG | TEMPERATURE: 98 F | SYSTOLIC BLOOD PRESSURE: 125 MMHG

## 2023-05-08 DIAGNOSIS — Z88.2 ALLERGY STATUS TO SULFONAMIDES: ICD-10-CM

## 2023-05-08 DIAGNOSIS — S63.105A UNSPECIFIED DISLOCATION OF LEFT THUMB, INITIAL ENCOUNTER: ICD-10-CM

## 2023-05-08 DIAGNOSIS — S50.311A ABRASION OF RIGHT ELBOW, INITIAL ENCOUNTER: ICD-10-CM

## 2023-05-08 DIAGNOSIS — S63.045A DISLOCATION OF CARPOMETACARPAL JOINT OF LEFT THUMB, INITIAL ENCOUNTER: ICD-10-CM

## 2023-05-08 DIAGNOSIS — S80.211A ABRASION, RIGHT KNEE, INITIAL ENCOUNTER: ICD-10-CM
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
## 2023-05-08 DIAGNOSIS — B20 HUMAN IMMUNODEFICIENCY VIRUS [HIV] DISEASE: ICD-10-CM

## 2023-05-08 DIAGNOSIS — Z23 ENCOUNTER FOR IMMUNIZATION: ICD-10-CM

## 2023-05-08 PROCEDURE — 73120 X-RAY EXAM OF HAND: CPT | Mod: 26,LT

## 2023-05-08 PROCEDURE — 73130 X-RAY EXAM OF HAND: CPT | Mod: 26,LT

## 2023-05-08 PROCEDURE — 73080 X-RAY EXAM OF ELBOW: CPT | Mod: 26,RT

## 2023-05-08 PROCEDURE — 99284 EMERGENCY DEPT VISIT MOD MDM: CPT | Mod: 25

## 2023-05-08 PROCEDURE — 29125 APPL SHORT ARM SPLINT STATIC: CPT

## 2023-05-08 RX ORDER — ACETAMINOPHEN 500 MG
650 TABLET ORAL ONCE
Refills: 0 | Status: COMPLETED | OUTPATIENT
Start: 2023-05-08 | End: 2023-05-08

## 2023-05-08 RX ORDER — TETANUS TOXOID, REDUCED DIPHTHERIA TOXOID AND ACELLULAR PERTUSSIS VACCINE, ADSORBED 5; 2.5; 8; 8; 2.5 [IU]/.5ML; [IU]/.5ML; UG/.5ML; UG/.5ML; UG/.5ML
0.5 SUSPENSION INTRAMUSCULAR ONCE
Refills: 0 | Status: COMPLETED | OUTPATIENT
Start: 2023-05-08 | End: 2023-05-08

## 2023-05-08 RX ADMIN — TETANUS TOXOID, REDUCED DIPHTHERIA TOXOID AND ACELLULAR PERTUSSIS VACCINE, ADSORBED 0.5 MILLILITER(S): 5; 2.5; 8; 8; 2.5 SUSPENSION INTRAMUSCULAR at 13:13

## 2023-05-08 RX ADMIN — Medication 650 MILLIGRAM(S): at 13:13

## 2023-05-08 NOTE — ED PROVIDER NOTE - PATIENT PORTAL LINK FT
You can access the FollowMyHealth Patient Portal offered by [facility] by registering at the following website: [email or web address]. By joining [employer]’s FollowMyHealth portal, you will also be able to view your health information using other applications (apps) compatible with our system.

## 2023-05-08 NOTE — ED ADULT NURSE NOTE - OBJECTIVE STATEMENT
wounds cleaned and dress. pt endorses that pain is managed and verbalizes readiness to discharge home. verbalizes understanding of follow up plan with hand specialist.

## 2023-05-08 NOTE — ED PROVIDER NOTE - PROGRESS NOTE DETAILS
thumb reduced. patient feels much better. Placed in thumb spica splint. Patient resting comfortably, feels markedly improved. Will refer to hand.

## 2023-05-08 NOTE — ED PROVIDER NOTE - CARE PROVIDER_API CALL
Dusty Cyr)  Plastic Surgery  [address], [address]  [place] [address]  Phone: [phone]  Fax: [phone]  Follow Up Time: 4-6 Days
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
## 2023-05-08 NOTE — ED PROVIDER NOTE - PHYSICAL EXAMINATION
Gen: Well-developed, well-nourished, NAD, HEENT: NCAT, mmm   Chest: RRR, nl S1 and S2, no m/r/g. Resp: CTAB, no w/r/r  Abd: nl BS, soft, nt/nd. Ext: Warm, dry. Left thumb: deformity at base of thumb. Right arm: superficial abrasion to posterior elbow, FROM, no effusion. Right leg: superficial abrasion to right knee. FROM. no effusion or tenderness. neurovascular and tendon intact distal to injury

## 2023-05-08 NOTE — ED PROVIDER NOTE - NSFOLLOWUPINSTRUCTIONS_ED_ALL_ED_FT
Finger or Thumb Dislocation  Finger or thumb dislocation happens when the bones in a joint move out of their normal positions. Dislocations may occur in any joint in the fingers or thumb. Finger or thumb dislocation is a common and serious injury.    What are the causes?  This condition is caused by a forceful impact or injury to the hand or when the finger or thumb bends the wrong way (hyperextension).    What increases the risk?  You are more likely to develop this condition if you:  Have previously injured your hand.  Do repetitive motions with your hands, such as when playing sports or doing heavy labor.  Have poor hand strength and flexibility.  What are the signs or symptoms?  Symptoms of this condition may include:  Deformity of the injured area. The affected joint may look like it is out of place or at an odd angle.  Pain, swelling, and bruising in the injured area.  Limited range of motion of the finger or thumb.  How is this diagnosed?  This condition is diagnosed with a physical exam. You may also have X-rays to check for breaks (fractures) in your bones.    How is this treated?  For mild dislocations, this condition is treated by moving your finger or thumb back into position (reduction). Your health care provider may do this by hand (manually) or with surgery. You may need surgical reduction if you have:  A severe dislocation.  A dislocation that cannot be reduced manually.  A fractured bone.  An open wound.  After reduction, your finger or thumb may be kept in a fixed position (immobilized) with a splint for up to 6 weeks. You may also need physical therapy. In some cases, you may need to go to a health care provider who specializes in bone disorders (orthopedist) to help treat your condition.    Follow these instructions at home:  If you have a splint:    Do not put pressure on any part of the splint until it is fully hardened. This may take several hours.  Wear the splint as told by your health care provider. Remove it only as told by your health care provider.  Loosen the splint if your fingers tingle, become numb, or turn cold and blue.  Keep the splint clean.  If the splint is not waterproof:  Do not let it get wet.  Cover it with a watertight covering when you take a bath or shower.  Managing pain, stiffness, and swelling    A bag of ice on a towel on the skin.  If directed, put ice on the injured area.  If you have a removable splint, remove it as told by your health care provider.  Put ice in a plastic bag.  Place a towel between your skin and the bag.  Leave the ice on for 20 minutes, 2–3 times a day.  Move your fingers often to reduce stiffness and swelling.  Raise (elevate) the injured area above the level of your heart while you are sitting or lying down.  Activity    Return to your normal activities as told by your health care provider. Ask your health care provider what activities are safe for you.  Rest and limit your hand movement as told by your health care provider.  If physical therapy was prescribed, do exercises as told by your health care provider.  Driving    Ask your health care provider if the medicine prescribed to you requires you to avoid driving or using heavy machinery.  Ask your health care provider when it is safe to drive if you have a splint on your hand.  General instructions    Take over-the-counter and prescription medicines only as told by your health care provider.  Do not take baths, swim, or use a hot tub until your health care provider approves. Ask your health care provider if you may take showers. You may only be allowed to take sponge baths.  Do not use any products that contain nicotine or tobacco, such as cigarettes, e-cigarettes, and chewing tobacco. These can delay bone healing. If you need help quitting, ask your health care provider.  Keep all follow-up visits as told by your health care provider. This is important.  Contact a health care provider if you have:  Problems with your splint.  Pain that gets worse or does not get better with medicine.  More bruising, swelling, or redness in your injured area.  Difficulty moving your finger or thumb after it heals.  Get help right away if:  You develop numbness in your finger or thumb.  You cannot move your finger or thumb.  Your finger or thumb is pale or cold.  You have severe pain.  Summary  Finger or thumb dislocation happens when the bones in a joint move out of their normal positions.  This condition is caused by a forceful impact or injury to the hand.  For mild dislocations, this condition is treated by moving your finger or thumb back into position (reduction).  You may need surgery if you have a severe dislocation, an open wound, or a fractured bone.  This information is not intended to replace advice given to you by your health care provider. Make sure you discuss any questions you have with your health care provider.    Document Revised: 04/07/2022 Document Reviewed: 04/07/2022

## 2024-11-29 NOTE — ED PROVIDER NOTE - WR ORDER DATE AND TIME 3
Clemente Guzman MD  [facility] now (9:27 AM)       Can check TSH, free T4, pls reassure this is extremely unlikely thyroid storm.     You routed conversation to Clemente Guzman MD22 minutes ago (9:05 AM)     Clemente Guzman MD [facility], [facility] Nurse Msg Pool23 minutes ago (9:05 AM)       Pls prescribe glucometer with FS/lancets to start checking BG if sxs occur.  Also recommend complex carb with protein intake , avoid simple sugary food items to avoid reactive hypoglycemia      08-May-2023 13:10
